# Patient Record
Sex: FEMALE | Race: WHITE | Employment: FULL TIME | URBAN - METROPOLITAN AREA
[De-identification: names, ages, dates, MRNs, and addresses within clinical notes are randomized per-mention and may not be internally consistent; named-entity substitution may affect disease eponyms.]

---

## 2020-10-27 ENCOUNTER — VIRTUAL VISIT (OUTPATIENT)
Dept: FAMILY MEDICINE CLINIC | Age: 30
End: 2020-10-27
Payer: MEDICAID

## 2020-10-27 DIAGNOSIS — F41.9 ANXIETY: ICD-10-CM

## 2020-10-27 DIAGNOSIS — F32.A DEPRESSION, UNSPECIFIED DEPRESSION TYPE: Primary | ICD-10-CM

## 2020-10-27 DIAGNOSIS — Z72.89 VAPES NON-NICOTINE CONTAINING SUBSTANCE: ICD-10-CM

## 2020-10-27 DIAGNOSIS — F10.10 ALCOHOL CONSUMPTION BINGE DRINKING: ICD-10-CM

## 2020-10-27 PROCEDURE — 99201 PR OFFICE OUTPATIENT NEW 10 MINUTES: CPT | Performed by: FAMILY MEDICINE

## 2020-10-27 RX ORDER — FLUTICASONE PROPIONATE 50 MCG
SPRAY, SUSPENSION (ML) NASAL
COMMUNITY
Start: 2020-09-25 | End: 2021-01-13

## 2020-10-27 RX ORDER — MONTELUKAST SODIUM 10 MG/1
TABLET ORAL
COMMUNITY
Start: 2020-09-25 | End: 2021-01-13

## 2020-10-27 RX ORDER — CETIRIZINE HCL 10 MG
TABLET ORAL
COMMUNITY
Start: 2020-09-25 | End: 2021-01-13

## 2020-10-27 RX ORDER — ALBUTEROL SULFATE 90 UG/1
AEROSOL, METERED RESPIRATORY (INHALATION)
COMMUNITY
Start: 2020-09-25 | End: 2021-01-13

## 2020-10-27 RX ORDER — ACETAMINOPHEN AND CODEINE PHOSPHATE 120; 12 MG/5ML; MG/5ML
1 SOLUTION ORAL DAILY
COMMUNITY
Start: 2020-08-06 | End: 2020-12-01

## 2020-10-27 RX ORDER — SERTRALINE HYDROCHLORIDE 50 MG/1
50 TABLET, FILM COATED ORAL DAILY
COMMUNITY
Start: 2020-08-06 | End: 2020-10-27

## 2020-10-27 RX ORDER — AZELASTINE 1 MG/ML
SPRAY, METERED NASAL
COMMUNITY
Start: 2020-09-25 | End: 2021-01-13

## 2020-10-27 RX ORDER — SERTRALINE HYDROCHLORIDE 100 MG/1
100 TABLET, FILM COATED ORAL DAILY
Qty: 30 TAB | Refills: 0 | Status: SHIPPED | OUTPATIENT
Start: 2020-10-27 | End: 2020-12-01

## 2020-10-27 NOTE — PROGRESS NOTES
Julieta Baez is a 26 y.o. female who was seen by synchronous (real-time) audio-video technology on 10/27/2020. Assessment & Plan:   Diagnoses and all orders for this visit:    1. Depression, unspecified depression type  -     sertraline (ZOLOFT) 100 mg tablet; Take 1 Tab by mouth daily. 2. Anxiety    3. Alcohol consumption binge drinking    4. Vapes non-nicotine containing substance        Depression seems to be doing OK, but has some anxiety  Increase Zoloft  Counseled to stop drinking and stop vapeing    Follow-up and Dispositions    · Return in about 1 month (around 11/27/2020) for depression, anxiety. Reviewed plan of care. Patient has provided input and agrees with goals. CPT Codes 49059-68247 for Established Patients may apply to this Telehealth Visit      Subjective:   Julieta Baez was seen for Establish Care and Medication Refill      Patient presents with:  Establish Care  Medication Refill    She has post-partum depression after giving birth nine months ago. Her OB is prescribing Zoloft which is working well. Also, she binge drinks, but is not ready to cut back. Review of Systems   Constitutional: Positive for malaise/fatigue. Negative for weight loss. No weight gain   Respiratory: Negative for shortness of breath. Cardiovascular: Negative for chest pain and palpitations. Psychiatric/Behavioral: Negative for depression, hallucinations, substance abuse and suicidal ideas. The patient is nervous/anxious. The patient does not have insomnia. Objective:     Physical Exam  Constitutional:       General: She is not in acute distress. Appearance: Normal appearance. Neurological:      Mental Status: She is alert and oriented to person, place, and time. Motor: No tremor. Psychiatric:         Mood and Affect: Mood normal.         Behavior: Behavior normal.         Thought Content:  Thought content normal.         Judgment: Judgment normal.         Due to this being a TeleHealth evaluation, many elements of the physical examination are unable to be assessed. We discussed the expected course, resolution and complications of the diagnosis(es) in detail. Medication risks, benefits, costs, interactions, and alternatives were discussed as indicated. I advised her to contact the office if her condition worsens, changes or fails to improve as anticipated. She expressed understanding with the diagnosis(es) and plan. Pursuant to the emergency declaration under the [address] [place] [address], [facility] waiver authority and the [employer] and [employer] Act, this Virtual  Visit was conducted, with patient's consent, to reduce the patient's risk of exposure to COVID-19 and provide continuity of care for an established patient. Services were provided through a video synchronous discussion virtually to substitute for in-person clinic visit.     Haleigh Waldrop MD

## 2020-10-27 NOTE — PROGRESS NOTES
Chief Complaint   Patient presents with   [address] [place] [address]    Medication Refill   1. Have you been to the ER, urgent care clinic since your last visit? Hospitalized since your last visit? No    2. Have you seen or consulted any other health care providers outside of the [address] [place] [address] since your last visit? Include any pap smears or colon screening.  No

## 2020-11-30 DIAGNOSIS — F32.A DEPRESSION, UNSPECIFIED DEPRESSION TYPE: ICD-10-CM

## 2020-11-30 RX ORDER — SERTRALINE HYDROCHLORIDE 100 MG/1
100 TABLET, FILM COATED ORAL DAILY
Qty: 30 TAB | Refills: 0 | Status: CANCELLED | OUTPATIENT
Start: 2020-11-30

## 2020-11-30 NOTE — TELEPHONE ENCOUNTER
Pt requesting refill on Zoloft stating she was only given 30 day supply. Pt scheduled for virtual visit with Dr. Tommie Palomo for tomorrow due to needing follow up and will address refill then.  Valente

## 2020-12-01 ENCOUNTER — VIRTUAL VISIT (OUTPATIENT)
Dept: FAMILY MEDICINE CLINIC | Age: 30
End: 2020-12-01
Payer: MEDICAID

## 2020-12-01 DIAGNOSIS — F32.A DEPRESSION, UNSPECIFIED DEPRESSION TYPE: Primary | ICD-10-CM

## 2020-12-01 DIAGNOSIS — F41.9 ANXIETY: ICD-10-CM

## 2020-12-01 PROCEDURE — 99213 OFFICE O/P EST LOW 20 MIN: CPT | Performed by: FAMILY MEDICINE

## 2020-12-01 RX ORDER — SERTRALINE HYDROCHLORIDE 100 MG/1
150 TABLET, FILM COATED ORAL DAILY
Qty: 45 TAB | Refills: 1 | Status: SHIPPED | OUTPATIENT
Start: 2020-12-01 | End: 2021-01-13

## 2020-12-01 NOTE — PROGRESS NOTES
Ashish Chin is a 26 y.o. female who was seen by synchronous (real-time) audio-video technology on 12/1/2020. Assessment & Plan:   Diagnoses and all orders for this visit:    1. Depression, unspecified depression type  -     sertraline (ZOLOFT) 100 mg tablet; Take 1.5 Tabs by mouth daily. 2. Anxiety  -     sertraline (ZOLOFT) 100 mg tablet; Take 1.5 Tabs by mouth daily. Doing better, needs a little more improvement on her anxiety and sleep  Increase Zoloft    Follow-up and Dispositions    · Return in about 6 weeks (around 1/12/2021) for depression, anxiety. Reviewed plan of care. Patient has provided input and agrees with goals. CPT Codes 76085-03806 for Established Patients may apply to this Telehealth Visit      Subjective:   Ashish Chin was seen for Depression and Medication Refill      Ashish Chin is here to follow up on their depression and anxiety. He/she is better. Currently, they are taking Zoloft which is/are working well. Review of Systems   Constitutional: Negative for malaise/fatigue and weight loss. No weight gain   Respiratory: Negative for shortness of breath. Cardiovascular: Negative for chest pain and palpitations. Psychiatric/Behavioral: Negative for depression, hallucinations, substance abuse and suicidal ideas. The patient is nervous/anxious. The patient does not have insomnia. She is sleeping too much         Objective:     Physical Exam  Constitutional:       General: She is not in acute distress. Appearance: Normal appearance. Neurological:      Mental Status: She is alert and oriented to person, place, and time. Motor: No tremor. Psychiatric:         Mood and Affect: Mood normal.         Behavior: Behavior normal.         Thought Content:  Thought content normal.         Judgment: Judgment normal.         Due to this being a TeleHealth evaluation, many elements of the physical examination are unable to be assessed. We discussed the expected course, resolution and complications of the diagnosis(es) in detail. Medication risks, benefits, costs, interactions, and alternatives were discussed as indicated. I advised her to contact the office if her condition worsens, changes or fails to improve as anticipated. She expressed understanding with the diagnosis(es) and plan. Pursuant to the emergency declaration under the [facility] [address] authority and the [employer] and [employer] Act, this Virtual  Visit was conducted, with patient's consent, to reduce the patient's risk of exposure to COVID-19 and provide continuity of care for an established patient. Services were provided through a video synchronous discussion virtually to substitute for in-person clinic visit.     Julio Zacarias MD

## 2020-12-01 NOTE — PROGRESS NOTES
Chief Complaint   Patient presents with    Depression    Medication Refill   1. Have you been to the ER, urgent care clinic since your last visit? Hospitalized since your last visit? No  2. Have you seen or consulted any other health care providers outside of the [address] [place] [address] since your last visit? Include any pap smears or colon screening.  No

## 2021-01-08 ENCOUNTER — TELEPHONE (OUTPATIENT)
Dept: FAMILY MEDICINE CLINIC | Age: 31
End: 2021-01-08

## 2021-01-08 NOTE — TELEPHONE ENCOUNTER
Pt requesting call back from Dr. Lilian Calhoun or her nurse regarding Zoloft stating she's been feeling spaced out and forgetting things at work but is not sure if this is related to the medicine or due to the fact she quit drinking, doing other drugs, and started smoking marijuana. Pt requests call back at [phone] to advise and did schedule in office follow up for 1/13/21 with Dr. Lilian Calhoun.   Valente

## 2021-01-13 ENCOUNTER — OFFICE VISIT (OUTPATIENT)
Dept: FAMILY MEDICINE CLINIC | Age: 31
End: 2021-01-13
Payer: MEDICAID

## 2021-01-13 VITALS
SYSTOLIC BLOOD PRESSURE: 121 MMHG | DIASTOLIC BLOOD PRESSURE: 82 MMHG | BODY MASS INDEX: 33.29 KG/M2 | HEART RATE: 63 BPM | WEIGHT: 195 LBS | HEIGHT: 64 IN | RESPIRATION RATE: 20 BRPM | TEMPERATURE: 97 F

## 2021-01-13 DIAGNOSIS — E66.9 CLASS 1 OBESITY WITHOUT SERIOUS COMORBIDITY WITH BODY MASS INDEX (BMI) OF 33.0 TO 33.9 IN ADULT, UNSPECIFIED OBESITY TYPE: ICD-10-CM

## 2021-01-13 DIAGNOSIS — F32.A DEPRESSION, UNSPECIFIED DEPRESSION TYPE: Primary | ICD-10-CM

## 2021-01-13 DIAGNOSIS — F41.9 ANXIETY: ICD-10-CM

## 2021-01-13 DIAGNOSIS — F10.10 ALCOHOL CONSUMPTION BINGE DRINKING: ICD-10-CM

## 2021-01-13 DIAGNOSIS — R63.5 WEIGHT GAIN: ICD-10-CM

## 2021-01-13 PROCEDURE — 99214 OFFICE O/P EST MOD 30 MIN: CPT | Performed by: FAMILY MEDICINE

## 2021-01-13 RX ORDER — SERTRALINE HYDROCHLORIDE 100 MG/1
200 TABLET, FILM COATED ORAL DAILY
Qty: 60 TAB | Refills: 1 | Status: SHIPPED | OUTPATIENT
Start: 2021-01-13 | End: 2021-04-04

## 2021-01-13 NOTE — PROGRESS NOTES
HISTORY OF PRESENT ILLNESS  Julio Birmingham is a 26 y.o. female. Julio Birmingham is here to follow up on their depression and anxiety. He/she is better. Currently, they are taking Zoloft which is/are working well. She has a history of alcohol abuse and quit drinking, however, now she is eating more and gaining weight. Review of Systems   Constitutional: Positive for malaise/fatigue. Negative for weight loss. Weight gain   Respiratory: Positive for shortness of breath. Cardiovascular: Negative for chest pain and palpitations. Psychiatric/Behavioral: Positive for hallucinations. Negative for depression, substance abuse and suicidal ideas. The patient is not nervous/anxious and does not have insomnia. THC       Visit Vitals  /82   Pulse 63   Temp 97 °F (36.1 °C) (Temporal)   Resp 20   Ht 5' 4\" (1.626 m)   Wt 195 lb (88.5 kg)   BMI 33.47 kg/m²     Physical Exam  Constitutional:       General: She is not in acute distress. Appearance: Normal appearance. Neurological:      Mental Status: She is alert and oriented to person, place, and time. Motor: No tremor. Psychiatric:         Mood and Affect: Mood normal.         Behavior: Behavior normal.         Thought Content: Thought content normal.         Judgment: Judgment normal.         ASSESSMENT and PLAN    ICD-10-CM ICD-9-CM    1. Depression, unspecified depression type  F32.9 311 sertraline (ZOLOFT) 100 mg tablet      REFERRAL TO PSYCHOLOGY   2. Anxiety  F41.9 300.00 sertraline (ZOLOFT) 100 mg tablet      REFERRAL TO PSYCHOLOGY   3. Alcohol consumption binge drinking  F10.10 305.00 REFERRAL TO PSYCHOLOGY   4. Class 1 obesity without serious comorbidity with body mass index (BMI) of 33.0 to 33.9 in adult, unspecified obesity type  E66.9 278.00     Z68.33 V85.33    5.  Weight gain  R63.5 783.1         Depression improving, fatigue may represent some residual symptoms  Anxiety doing well  Not drinking  Weight gain  Increase Zoloft  Referred for counseling  Regular exercise, watch diet    Follow-up and Dispositions    · Return in about 6 weeks (around 2/24/2021) for depression, anxiety. Reviewed plan of care. Patient has provided input and agrees with goals.

## 2021-03-02 ENCOUNTER — VIRTUAL VISIT (OUTPATIENT)
Dept: FAMILY MEDICINE CLINIC | Age: 31
End: 2021-03-02
Payer: MEDICAID

## 2021-03-02 DIAGNOSIS — F32.A DEPRESSION, UNSPECIFIED DEPRESSION TYPE: Primary | ICD-10-CM

## 2021-03-02 DIAGNOSIS — L50.9 URTICARIA: ICD-10-CM

## 2021-03-02 DIAGNOSIS — F10.10 ALCOHOL CONSUMPTION BINGE DRINKING: ICD-10-CM

## 2021-03-02 DIAGNOSIS — F41.9 ANXIETY: ICD-10-CM

## 2021-03-02 PROCEDURE — 99214 OFFICE O/P EST MOD 30 MIN: CPT | Performed by: FAMILY MEDICINE

## 2021-03-02 RX ORDER — CIMETIDINE 400 MG/1
400 TABLET, FILM COATED ORAL 2 TIMES DAILY
Qty: 60 TAB | Refills: 0 | Status: SHIPPED | OUTPATIENT
Start: 2021-03-02 | End: 2021-03-16

## 2021-03-02 RX ORDER — CETIRIZINE HYDROCHLORIDE 10 MG/1
10 CAPSULE, LIQUID FILLED ORAL DAILY
Qty: 30 CAP | Refills: 0 | Status: SHIPPED | OUTPATIENT
Start: 2021-03-02 | End: 2021-03-16

## 2021-03-02 NOTE — PROGRESS NOTES
Rosmery Sharma is a 26 y.o. female who was seen by synchronous (real-time) audio-video technology on 3/2/2021. Assessment & Plan:   Diagnoses and all orders for this visit:    1. Depression, unspecified depression type    2. Anxiety    3. Alcohol consumption binge drinking    4. Urticaria  -     Cetirizine (ZyrTEC) 10 mg cap; Take 10 mg by mouth daily. -     cimetidine (TAGAMET) 400 mg tablet; Take 1 Tab by mouth two (2) times a day. Depression and anxiety doing well  Abstaining from alcohol  Likely urticaria  Continue current plans. Daily Zyrtec and BID Tagamet for one month  She will call right away if she has further skin symptoms  To ER right away if lip or facial swelling    Follow-up and Dispositions    · Return in about 3 months (around 6/2/2021) for depression, anxiety. Reviewed plan of care. Patient has provided input and agrees with goals. CPT Codes 23170-99600 for Established Patients may apply to this Telehealth Visit      Subjective:   Rosmery Sharma was seen for Depression (follow up ) and Skin Problem (itching skin/rash )      Patient presents with:  Depression: follow up   Skin Problem: itching skin/rash     Rosmery Sharma is here to follow up on their depression and anxiety. He/she is better. Currently, they are taking Zoloft which is/are working well    She also has a history of binge drinking and has had no alcohol for almost 3 months. The itching started almost a week ago. It has been daily and lasts for about 15 minutes. She had 2 hives once, otherwise it is red. Currently, she is taking Benadryl Allergy but she is not sure if this helps. The itching is mainly on her back and arms. Review of Systems   Constitutional: Negative for malaise/fatigue and weight loss. No weight gain   Respiratory: Negative for shortness of breath. Cardiovascular: Negative for chest pain and palpitations.    Psychiatric/Behavioral: Positive for substance abuse. Negative for depression, hallucinations and suicidal ideas. The patient is not nervous/anxious and does not have insomnia. Occasional THC         Objective:     Physical Exam  Constitutional:       General: She is not in acute distress. Appearance: Normal appearance. Neurological:      Mental Status: She is alert and oriented to person, place, and time. Motor: No tremor. Psychiatric:         Mood and Affect: Mood normal.         Behavior: Behavior normal.         Thought Content: Thought content normal.         Judgment: Judgment normal.         Due to this being a TeleHealth evaluation, many elements of the physical examination are unable to be assessed. We discussed the expected course, resolution and complications of the diagnosis(es) in detail. Medication risks, benefits, costs, interactions, and alternatives were discussed as indicated. I advised her to contact the office if her condition worsens, changes or fails to improve as anticipated. She expressed understanding with the diagnosis(es) and plan. Pursuant to the emergency declaration under the [facility], [facility] waiver authority and the [employer] and [employer] General Act, this Virtual  Visit was conducted, with patient's consent, to reduce the patient's risk of exposure to COVID-19 and provide continuity of care for an established patient. Services were provided through a video synchronous discussion virtually to substitute for in-person clinic visit.     Rudolph Dickinson MD

## 2021-03-02 NOTE — PROGRESS NOTES
Chief Complaint   Patient presents with    Depression     follow up     Skin Problem     itching skin/rash

## 2021-03-15 ENCOUNTER — TELEPHONE (OUTPATIENT)
Dept: FAMILY MEDICINE CLINIC | Age: 31
End: 2021-03-15

## 2021-03-16 ENCOUNTER — VIRTUAL VISIT (OUTPATIENT)
Dept: FAMILY MEDICINE CLINIC | Age: 31
End: 2021-03-16
Payer: MEDICAID

## 2021-03-16 DIAGNOSIS — F41.9 ANXIETY: ICD-10-CM

## 2021-03-16 DIAGNOSIS — F10.10 ALCOHOL CONSUMPTION BINGE DRINKING: ICD-10-CM

## 2021-03-16 DIAGNOSIS — F32.A DEPRESSION, UNSPECIFIED DEPRESSION TYPE: Primary | ICD-10-CM

## 2021-03-16 PROCEDURE — 99214 OFFICE O/P EST MOD 30 MIN: CPT | Performed by: FAMILY MEDICINE

## 2021-03-16 RX ORDER — BUSPIRONE HYDROCHLORIDE 10 MG/1
10 TABLET ORAL 3 TIMES DAILY
Qty: 90 TAB | Refills: 0 | Status: SHIPPED | OUTPATIENT
Start: 2021-03-16 | End: 2021-04-18

## 2021-03-16 RX ORDER — ETONOGESTREL AND ETHINYL ESTRADIOL 11.7; 2.7 MG/1; MG/1
INSERT, EXTENDED RELEASE VAGINAL
COMMUNITY

## 2021-03-16 NOTE — PROGRESS NOTES
Chief Complaint   Patient presents with    Depression     Follow up    Medication Evaluation     1. Have you been to the ER, urgent care clinic since your last visit? Hospitalized since your last visit? No    2. Have you seen or consulted any other health care providers outside of the [address] [place] [address] since your last visit? Include any pap smears or colon screening.  No

## 2021-03-16 NOTE — PROGRESS NOTES
Oscar Rocha is a 26 y.o. female who was seen by synchronous (real-time) audio-video technology on 3/16/2021. Assessment & Plan:   Diagnoses and all orders for this visit:    1. Depression, unspecified depression type  -     REFERRAL TO PSYCHOLOGY    2. Anxiety  -     REFERRAL TO PSYCHOLOGY  -     busPIRone (BUSPAR) 10 mg tablet; Take 1 Tab by mouth three (3) times daily. 3. Alcohol consumption binge drinking        Depression and anxiety worse due to stress  Fortunately not drinking again but at risk for this  Add Buspar  She is going to call this afternoon to set up a psychology appointment  Commended on abstaining from alcohol    Follow-up and Dispositions    · Return in about 1 week (around 3/23/2021) for depression, anxiety, alcohol use. Reviewed plan of care. Patient has provided input and agrees with goals. CPT Codes 61259-27394 for Established Patients may apply to this Telehealth Visit      Subjective:   Oscar Rocha was seen for Depression (Follow up) and Medication Evaluation      Patient presents with:  Depression: Follow up  Medication Evaluation    She has had a bad couple of weeks. Lately, she has been really sad, was not sleeping until last night and her appetite has been decreased. She just starting splitting custody, broke up with another kali and her car broke down. She cried so hysterically at work she had to go home. No alcohol. She never scheduled for counseling. Her anxiety is worse than her depression. Review of Systems   Constitutional: Positive for weight loss. Negative for malaise/fatigue. No weight gain   Respiratory: Negative for shortness of breath. Cardiovascular: Negative for chest pain and palpitations. Psychiatric/Behavioral: Positive for depression and substance abuse. Negative for hallucinations and suicidal ideas. The patient is nervous/anxious and has insomnia.          THC         Objective:     Physical Exam  Constitutional:       General: She is not in acute distress. Appearance: Normal appearance. Neurological:      Mental Status: She is alert and oriented to person, place, and time. Motor: No tremor. Psychiatric:         Mood and Affect: Mood normal.         Behavior: Behavior normal.         Thought Content: Thought content normal.         Judgment: Judgment normal.         Due to this being a TeleHealth evaluation, many elements of the physical examination are unable to be assessed. We discussed the expected course, resolution and complications of the diagnosis(es) in detail. Medication risks, benefits, costs, interactions, and alternatives were discussed as indicated. I advised her to contact the office if her condition worsens, changes or fails to improve as anticipated. She expressed understanding with the diagnosis(es) and plan. Pursuant to the emergency declaration under the [facility], [facility] waiver authority and the [employer] and [employer] General Act, this Virtual  Visit was conducted, with patient's consent, to reduce the patient's risk of exposure to COVID-19 and provide continuity of care for an established patient. Services were provided through a video synchronous discussion virtually to substitute for in-person clinic visit.     Ed Gonzalez MD

## 2021-04-01 DIAGNOSIS — F32.A DEPRESSION, UNSPECIFIED DEPRESSION TYPE: ICD-10-CM

## 2021-04-01 DIAGNOSIS — F41.9 ANXIETY: ICD-10-CM

## 2021-04-04 RX ORDER — SERTRALINE HYDROCHLORIDE 100 MG/1
TABLET, FILM COATED ORAL
Qty: 60 TAB | Refills: 1 | Status: SHIPPED | OUTPATIENT
Start: 2021-04-04 | End: 2021-05-14 | Stop reason: SDUPTHER

## 2021-04-05 ENCOUNTER — TELEPHONE (OUTPATIENT)
Dept: FAMILY MEDICINE CLINIC | Age: 31
End: 2021-04-05

## 2021-04-05 NOTE — TELEPHONE ENCOUNTER
Called pt, and left a voice message, RX was sent to her pharmacy on yesterday. Advised to call office back with questions or concerns.

## 2021-04-05 NOTE — TELEPHONE ENCOUNTER
----- Message from Stu Nunez sent at 4/3/2021 11:32 AM EDT -----  Regarding: Dr. Fowler Cypriot: [phone]  General Message/Vendor Calls    Caller's first and last name: Pt.       Reason for call: Requested Rx refill on Thursday, pharmacy does not have Rx. Pt is out of medication. Callback required yes/no and why: Yes, status of Rx refill request.       Best contact number(s): [phone]      Details to clarify the request: n/a.       Stu Nunez

## 2021-04-18 DIAGNOSIS — F41.9 ANXIETY: ICD-10-CM

## 2021-04-18 RX ORDER — BUSPIRONE HYDROCHLORIDE 10 MG/1
TABLET ORAL
Qty: 270 TAB | Refills: 3 | Status: SHIPPED | OUTPATIENT
Start: 2021-04-18 | End: 2022-03-17

## 2021-04-30 ENCOUNTER — VIRTUAL VISIT (OUTPATIENT)
Dept: FAMILY MEDICINE CLINIC | Age: 31
End: 2021-04-30

## 2021-04-30 DIAGNOSIS — Z71.84 TRAVEL ADVICE ENCOUNTER: Primary | ICD-10-CM

## 2021-04-30 PROCEDURE — 99214 OFFICE O/P EST MOD 30 MIN: CPT | Performed by: FAMILY MEDICINE

## 2021-04-30 NOTE — PROGRESS NOTES
Jeanne Villalta is a 26 y.o. female who was seen by synchronous (real-time) audio-video technology on 4/30/2021. Assessment & Plan:   Diagnoses and all orders for this visit:    1. Travel advice encounter        Researched travel immunizations and medications  It appears that she needs hep A, yellow fever and TDAP immunizations plus malaria prophylaxis and she was advised of this  She is unsure of her childhood immunizations  Advised that she get her childhood immunizations and make sure they are up to date  Recommended the [facility] travel web site  Contact the state health department to find a yellow fever vaccine destination  Questions answered    Over 30 minutes was spent in patient care    Follow-up and Dispositions    · Return if symptoms worsen or fail to improve. Reviewed plan of care. Patient has provided input and agrees with goals. CPT Codes 98604-38602 for Established Patients may apply to this Telehealth Visit      Subjective:   Jeanne Villalta was seen for Travel Consult      She is going to [place] in about 2 months and she needs a vaccine. 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 She will be going to [facility] [place] and [facility]. It has been over 10 years since her last tetanus. She is planning to stay in the city in hotels. Review of Systems   Constitutional: Negative for malaise/fatigue. Objective:     Physical Exam  Constitutional:       General: She is not in acute distress. Appearance: Normal appearance. Neurological:      Mental Status: She is alert and oriented to person, place, and time. Due to this being a TeleHealth evaluation, many elements of the physical examination are unable to be assessed. We discussed the expected course, resolution and complications of the diagnosis(es) in detail. Medication risks, benefits, costs, interactions, and alternatives were discussed as indicated.   I advised her to contact the office if her condition worsens, changes or fails to improve as anticipated. She expressed understanding with the diagnosis(es) and plan. Pursuant to the emergency declaration under the [facility], [facility], [facility] waiver authority and the [employer] and [employer] Act, this Virtual  Visit was conducted, with patient's consent, to reduce the patient's risk of exposure to COVID-19 and provide continuity of care for an established patient. Services were provided through a video synchronous discussion virtually to substitute for in-person clinic visit.     Erasto Murphy MD

## 2021-05-14 DIAGNOSIS — F41.9 ANXIETY: ICD-10-CM

## 2021-05-14 DIAGNOSIS — F32.A DEPRESSION, UNSPECIFIED DEPRESSION TYPE: ICD-10-CM

## 2021-05-14 RX ORDER — SERTRALINE HYDROCHLORIDE 100 MG/1
TABLET, FILM COATED ORAL
Qty: 180 TAB | Refills: 3 | Status: SHIPPED | OUTPATIENT
Start: 2021-05-14 | End: 2022-03-17

## 2021-05-14 NOTE — TELEPHONE ENCOUNTER
Pt called stating she lost her Zoloft medication and wants to know if Dr. Galvin Romberg can send another prescription to [address] [place] [address]. Pt will contact her insurance company to request vacation override since this may trigger refill too soon if ordered again. Pt also wants to advise Dr. Galvin Romberg she was able to get the Yellow Fever vaccine.  Valente

## 2022-03-17 ENCOUNTER — VIRTUAL VISIT (OUTPATIENT)
Dept: FAMILY MEDICINE CLINIC | Age: 32
End: 2022-03-17
Payer: MEDICAID

## 2022-03-17 DIAGNOSIS — F32.A DEPRESSION, UNSPECIFIED DEPRESSION TYPE: ICD-10-CM

## 2022-03-17 DIAGNOSIS — F41.9 ANXIETY: ICD-10-CM

## 2022-03-17 PROCEDURE — 99214 OFFICE O/P EST MOD 30 MIN: CPT | Performed by: FAMILY MEDICINE

## 2022-03-17 RX ORDER — BUSPIRONE HYDROCHLORIDE 10 MG/1
10 TABLET ORAL 3 TIMES DAILY
Qty: 90 TABLET | Refills: 0 | Status: SHIPPED | OUTPATIENT
Start: 2022-03-17 | End: 2022-04-19 | Stop reason: SDUPTHER

## 2022-03-17 RX ORDER — SERTRALINE HYDROCHLORIDE 50 MG/1
50 TABLET, FILM COATED ORAL DAILY
Qty: 30 TABLET | Refills: 0 | Status: SHIPPED | OUTPATIENT
Start: 2022-03-17 | End: 2022-04-19 | Stop reason: ALTCHOICE

## 2022-03-17 NOTE — PROGRESS NOTES
Kelsey Thomas is a 33 y.o. female who was seen by synchronous (real-time) audio-video technology on 3/17/2022. Assessment & Plan:   Diagnoses and all orders for this visit:    1. Depression, unspecified depression type  -     sertraline (ZOLOFT) 50 mg tablet; Take 1 Tablet by mouth daily.  -     REFERRAL TO PSYCHOLOGY    2. Anxiety  -     sertraline (ZOLOFT) 50 mg tablet; Take 1 Tablet by mouth daily. -     busPIRone (BUSPAR) 10 mg tablet; Take 1 Tablet by mouth three (3) times daily.  -     REFERRAL TO PSYCHOLOGY        Not doing well off medications  Restart Zoloft at a lower dose  Restart Buspar  Psychology referral    Follow-up and Dispositions    · Return in about 3 weeks (around 4/7/2022) for depression, anxiety. Reviewed plan of care. Patient has provided input and agrees with goals. CPT Codes 35632-85555 for Established Patients may apply to this Telehealth Visit      Subjective:   Kelsey Thomas was seen for Medication Evaluation (Zoloft and Buspar) and Anxiety (Would like to discuss restarting medication.)      Patient presents with:  Medication Evaluation: Zoloft and Buspar  Anxiety: Would like to discuss restarting medication. She is having trouble with her depression and anxiety. The last time she took her medications was in May. Review of Systems   Constitutional: Negative for malaise/fatigue and weight loss. Weight gain   Respiratory: Negative for shortness of breath. Cardiovascular: Negative for chest pain and palpitations. Psychiatric/Behavioral: Positive for depression. Negative for hallucinations, substance abuse and suicidal ideas. The patient is nervous/anxious and has insomnia. Objective:     Physical Exam  Constitutional:       General: She is not in acute distress. Appearance: Normal appearance. Neurological:      Mental Status: She is alert and oriented to person, place, and time. Motor: No tremor.    Psychiatric: Mood and Affect: Mood normal.         Behavior: Behavior normal.         Thought Content: Thought content normal.         Judgment: Judgment normal.         Due to this being a TeleHealth evaluation, many elements of the physical examination are unable to be assessed. We discussed the expected course, resolution and complications of the diagnosis(es) in detail. Medication risks, benefits, costs, interactions, and alternatives were discussed as indicated. I advised her to contact the office if her condition worsens, changes or fails to improve as anticipated. She expressed understanding with the diagnosis(es) and plan. Pursuant to the emergency declaration under the [facility] waiver authority and the [employer] and [employer] Act, this Virtual  Visit was conducted, with patient's consent, to reduce the patient's risk of exposure to COVID-19 and provide continuity of care for an established patient. Services were provided through a video synchronous discussion virtually to substitute for in-person clinic visit.     Jose Carlos Avery MD

## 2022-03-17 NOTE — PROGRESS NOTES
Chief Complaint   Patient presents with    Medication Evaluation     Zoloft and Buspar    Anxiety     Would like to discuss restarting medication. 1. Have you been to the ER, urgent care clinic since your last visit? Hospitalized since your last visit? Yes, [facility], Ear infection and GI bug.    2. Have you seen or consulted any other health care providers outside of the [address] [place] [address] since your last visit? Include any pap smears or colon screening.  No

## 2022-03-20 PROBLEM — F41.9 ANXIETY: Status: ACTIVE | Noted: 2020-10-27

## 2022-04-19 ENCOUNTER — VIRTUAL VISIT (OUTPATIENT)
Dept: FAMILY MEDICINE CLINIC | Age: 32
End: 2022-04-19
Payer: MEDICAID

## 2022-04-19 DIAGNOSIS — Z11.59 NEED FOR HEPATITIS C SCREENING TEST: ICD-10-CM

## 2022-04-19 DIAGNOSIS — F32.A DEPRESSION, UNSPECIFIED DEPRESSION TYPE: Primary | ICD-10-CM

## 2022-04-19 DIAGNOSIS — R63.5 WEIGHT GAIN: ICD-10-CM

## 2022-04-19 DIAGNOSIS — F41.9 ANXIETY: ICD-10-CM

## 2022-04-19 PROCEDURE — 99214 OFFICE O/P EST MOD 30 MIN: CPT | Performed by: FAMILY MEDICINE

## 2022-04-19 RX ORDER — BUPROPION HYDROCHLORIDE 150 MG/1
150 TABLET ORAL DAILY
Qty: 30 TABLET | Refills: 0 | Status: SHIPPED | OUTPATIENT
Start: 2022-04-19 | End: 2022-05-20 | Stop reason: SDUPTHER

## 2022-04-19 RX ORDER — SERTRALINE HYDROCHLORIDE 50 MG/1
50 TABLET, FILM COATED ORAL DAILY
Qty: 30 TABLET | Refills: 0 | Status: CANCELLED | OUTPATIENT
Start: 2022-04-19

## 2022-04-19 RX ORDER — BUSPIRONE HYDROCHLORIDE 10 MG/1
10 TABLET ORAL 3 TIMES DAILY
Qty: 90 TABLET | Refills: 0 | Status: SHIPPED | OUTPATIENT
Start: 2022-04-19 | End: 2022-05-20 | Stop reason: SDUPTHER

## 2022-04-19 NOTE — LETTER
4/19/2022 8:38 AM    Ms. Yared [facility] [address] [facility]  [address] [facility]  Luigi [address]  Phone: [phone]  Fax: [phone]    4/19/2022     Andrew Craig   1990   [address] [place] [address]      To Whom It May Concern,    The above-named patient is receiving medical care at [facility]. Please fax a copy of the following document(s) for continuity of care. Fax number :  [phone] - pa results     We look forward to partnering with you to provide collaborative patient care. Please reach out to our office at [phone] if there are questions regarding this request.    Sincerely,    Pat Marcelino and Anabella [address]

## 2022-04-19 NOTE — PROGRESS NOTES
Janice Sampson is a 33 y.o. female who was seen by synchronous (real-time) audio-video technology on 4/19/2022. Assessment & Plan:   Diagnoses and all orders for this visit:    1. Depression, unspecified depression type  -     buPROPion XL (Wellbutrin XL) 150 mg tablet; Take 1 Tablet by mouth daily. 2. Anxiety  -     busPIRone (BUSPAR) 10 mg tablet; Take 1 Tablet by mouth three (3) times daily. 3. Weight gain    4. Need for hepatitis C screening test  -     HEPATITIS C AB; Future        Depression doing well, anxiety doing fairly well  Weight gain likely due to Zoloft  Stop Zoloft  start Wellbutrin - cautioned that his may make her anxiety worse    Follow-up and Dispositions    · Return in about 3 weeks (around 5/10/2022) for depression. Reviewed plan of care. Patient has provided input and agrees with goals. CPT Codes 56544-66889 for Established Patients may apply to this Telehealth Visit      Subjective:   Janice Sampson was seen for Depression (follow up - pt states RX's are working well, but feels she has had weight gain ) and Anxiety (follow up )      Patient presents with:  Depression: follow up - pt states RX's are working well, but feels she has had weight gain   Anxiety: follow up     Her anxiety has improved and medications are working well. However, she has gained 5-10 pounds over the past month. She has been more hungry and is craving carbs. Also, she has a history of excessive alcohol use. She is drinking about 2 drinks a week. Review of Systems   Constitutional: Negative for malaise/fatigue and weight loss. Weight gain   Respiratory: Negative for shortness of breath. Cardiovascular: Negative for chest pain and palpitations. Psychiatric/Behavioral: Negative for depression, hallucinations, substance abuse and suicidal ideas. The patient is nervous/anxious. The patient does not have insomnia.           Objective:     Physical Exam  Constitutional:       General: She is not in acute distress. Appearance: Normal appearance. Neurological:      Mental Status: She is alert and oriented to person, place, and time. Motor: No tremor. Psychiatric:         Mood and Affect: Mood normal.         Behavior: Behavior normal.         Thought Content: Thought content normal.         Judgment: Judgment normal.         Due to this being a TeleHealth evaluation, many elements of the physical examination are unable to be assessed. We discussed the expected course, resolution and complications of the diagnosis(es) in detail. Medication risks, benefits, costs, interactions, and alternatives were discussed as indicated. I advised her to contact the office if her condition worsens, changes or fails to improve as anticipated. She expressed understanding with the diagnosis(es) and plan. Pursuant to the emergency declaration under the [facility], [facility] waiver authority and the [employer] and [employer] General Act, this Virtual  Visit was conducted, with patient's consent, to reduce the patient's risk of exposure to COVID-19 and provide continuity of care for an established patient. Services were provided through a video synchronous discussion virtually to substitute for in-person clinic visit.     Tabatha Marino MD

## 2022-04-19 NOTE — PROGRESS NOTES
Chief Complaint   Patient presents with    Depression     follow up - pt states RX's are working well, but feels she has had weight gain     Anxiety     follow up      Dr. Liset Garrett, GYN- records requested

## 2022-05-20 ENCOUNTER — VIRTUAL VISIT (OUTPATIENT)
Dept: FAMILY MEDICINE CLINIC | Age: 32
End: 2022-05-20
Payer: MEDICAID

## 2022-05-20 DIAGNOSIS — F41.9 ANXIETY: ICD-10-CM

## 2022-05-20 DIAGNOSIS — F32.A DEPRESSION, UNSPECIFIED DEPRESSION TYPE: ICD-10-CM

## 2022-05-20 PROCEDURE — 99214 OFFICE O/P EST MOD 30 MIN: CPT | Performed by: FAMILY MEDICINE

## 2022-05-20 RX ORDER — BUSPIRONE HYDROCHLORIDE 10 MG/1
10 TABLET ORAL
Qty: 90 TABLET | Refills: 0
Start: 2022-05-20

## 2022-05-20 RX ORDER — BUPROPION HYDROCHLORIDE 150 MG/1
150 TABLET ORAL DAILY
Qty: 90 TABLET | Refills: 0 | Status: SHIPPED | OUTPATIENT
Start: 2022-05-20

## 2022-05-20 NOTE — PROGRESS NOTES
Compa Yancey is a 33 y.o. female who was seen by synchronous (real-time) audio-video technology on 5/20/2022. Assessment & Plan:   Diagnoses and all orders for this visit:    1. Depression, unspecified depression type  -     buPROPion XL (Wellbutrin XL) 150 mg tablet; Take 1 Tablet by mouth daily. 2. Anxiety  -     busPIRone (BUSPAR) 10 mg tablet; Take 1 Tablet by mouth three (3) times daily as needed (anxiety). Depression doing well but anxiety a little worse, did not tolerate Zoloft due to weight gain  Continue current plans. Refills per orders  Continue Buspar psn  She is going to get counseling through her EAP    Follow-up and Dispositions    · Return in about 3 weeks (around 6/10/2022) for depression, anxiety. Reviewed plan of care. Patient has provided input and agrees with goals. CPT Codes 47083-33008 for Established Patients may apply to this Telehealth Visit      Subjective:   Compa Yancey was seen for Medication Refill      Compa Yancey is here to follow up on their anxiety and depression. At her last visit, her Zoloft was discontinued and Wellbutrin was started because she was gaining weight. He/she is unchanged, but the anxiety is a little worse. It is manageable. The Buspar was causing dizziness, sleepiness and headaches, so she is taking a lot less and is better. Review of Systems   Constitutional: Negative for malaise/fatigue and weight loss. No weight gain   Respiratory: Negative for shortness of breath. Cardiovascular: Negative for chest pain and palpitations. Psychiatric/Behavioral: Negative for depression, hallucinations, substance abuse and suicidal ideas. The patient is nervous/anxious. The patient does not have insomnia. Objective:     Physical Exam  Constitutional:       General: She is not in acute distress. Appearance: Normal appearance.    Neurological:      Mental Status: She is alert and oriented to person, place, and time. Motor: No tremor. Psychiatric:         Mood and Affect: Mood normal.         Behavior: Behavior normal.         Thought Content: Thought content normal.         Judgment: Judgment normal.         Due to this being a TeleHealth evaluation, many elements of the physical examination are unable to be assessed. We discussed the expected course, resolution and complications of the diagnosis(es) in detail. Medication risks, benefits, costs, interactions, and alternatives were discussed as indicated. I advised her to contact the office if her condition worsens, changes or fails to improve as anticipated. She expressed understanding with the diagnosis(es) and plan. Pursuant to the emergency declaration under the [facility], [facility] waiver authority and the [employer] and [employer] General Act, this Virtual  Visit was conducted, with patient's consent, to reduce the patient's risk of exposure to COVID-19 and provide continuity of care for an established patient. Services were provided through a video synchronous discussion virtually to substitute for in-person clinic visit.     Hank Jacob MD

## 2022-05-20 NOTE — PROGRESS NOTES
Chief Complaint   Patient presents with    Medication Refill     1. Have you been to the ER, urgent care clinic since your last visit? Hospitalized since your last visit? No    2. Have you seen or consulted any other health care providers outside of the [address] [place] [address] since your last visit? Include any pap smears or colon screening.  No

## 2023-02-24 ENCOUNTER — OFFICE VISIT (OUTPATIENT)
Dept: FAMILY MEDICINE CLINIC | Age: 33
End: 2023-02-24
Payer: MEDICAID

## 2023-02-24 VITALS
HEIGHT: 64 IN | OXYGEN SATURATION: 97 % | BODY MASS INDEX: 35.85 KG/M2 | RESPIRATION RATE: 20 BRPM | HEART RATE: 63 BPM | WEIGHT: 210 LBS | SYSTOLIC BLOOD PRESSURE: 134 MMHG | DIASTOLIC BLOOD PRESSURE: 88 MMHG | TEMPERATURE: 98.4 F

## 2023-02-24 DIAGNOSIS — O26.86 PRURITIC URTICARIAL PAPULES AND PLAQUES OF PREGNANCY: Primary | ICD-10-CM

## 2023-02-24 PROBLEM — L29.8 PRURITIC ERYTHEMATOUS RASH: Status: ACTIVE | Noted: 2023-02-24

## 2023-02-24 RX ORDER — CETIRIZINE HYDROCHLORIDE 10 MG/1
10 TABLET ORAL
Qty: 30 TABLET | Refills: 0 | Status: SHIPPED | OUTPATIENT
Start: 2023-02-24 | End: 2023-03-26

## 2023-02-24 RX ORDER — TRIAMCINOLONE ACETONIDE 1 MG/G
OINTMENT TOPICAL
COMMUNITY
Start: 2023-02-20

## 2023-02-24 RX ORDER — PREDNISONE 20 MG/1
40 TABLET ORAL DAILY
Qty: 10 TABLET | Refills: 0 | Status: SHIPPED | OUTPATIENT
Start: 2023-02-24 | End: 2023-03-01

## 2023-02-24 NOTE — ASSESSMENT & PLAN NOTE
Visually consistent with hives, and onset pattern seems very much likely PUPP, but rash appeared postpartum which is less common, though it is documented. No signs of other systemic involvement, and lack of topical improvement likely due to need for oral therapy. Discussed oral prednisone and antihistamines, and risk/benefits in light of breastfeeding.     - Prednisone burst 40mg daily x 5 days  - Zyrtec 10mg daily  - see again next week to ensure improvement, if signs of worsening despite therapy or new systemic symptoms, present to urgent care/ED.

## 2023-02-24 NOTE — PROGRESS NOTES
Chief Complaint   Patient presents with    Rash     Had a baby 10 days ago   Abdomen, arms, legs red  itchy spots   Has been taking Triamcinolone- not helping   Has had for over 1 week - but worsening       Has started a new body wash.

## 2023-02-24 NOTE — PROGRESS NOTES
Geovany Santamaria (: 1990) is a 27 y.o. female, established patient, here for evaluation of the following chief complaint(s):  Rash (Had a baby 10 days ago /Abdomen, arms, legs red  itchy spots /Has been taking Triamcinolone- not helping /Has had for over 1 week - but worsening  )       ASSESSMENT/PLAN:  Below is the assessment and plan developed based on review of pertinent history, physical exam, labs, studies, and medications. 1. Pruritic urticarial papules and plaques of pregnancy  Assessment & Plan:  Visually consistent with hives, and onset pattern seems very much likely PUPP, but rash appeared postpartum which is less common, though it is documented. No signs of other systemic involvement, and lack of topical improvement likely due to need for oral therapy. Discussed oral prednisone and antihistamines, and risk/benefits in light of breastfeeding.     - Prednisone burst 40mg daily x 5 days  - Zyrtec 10mg daily  - see again next week to ensure improvement, if signs of worsening despite therapy or new systemic symptoms, present to urgent care/ED. 2. Hemorrhoids, postpartum  Preparation H cream OTC for next week. Follow up at next visit. Return in about 1 week (around 3/3/2023) for hives/rash management. SUBJECTIVE/OBJECTIVE:  HPI  Chronic Conditions Addressed Today       1. Pruritic erythematous rash - Primary     Overview      Some itching of anterior abdomen week prior to delivery. Delivered healthy baby 1 week ago, and since then has started developing itchy raised erythematous rash with wheeling appearance. Started on anterior abdomen around stretch marks, then spread to anterior thighs, bilateral flanks, and extremities, sparing palmoplantar regions and face/groin. Patient has been applying topical triamcinolone 0.1% cream without improvement. Denies vision changes, chest pain, urinary changes, abdominal pain, yellowing of skin.                               2. Hemorrhoids, postpartum Relevant Medications     [id number].Pet (PREPARATION H MAXIMUM STRENGTH) 0.25-1 % rectal cream         Review of Systems  As per HPI    Physical Exam  Visit Vitals  /88   Pulse 63   Temp 98.4 °F (36.9 °C) (Temporal)   Resp 20   Ht 5' 4\" (1.626 m)   Wt 210 lb (95.3 kg)   SpO2 97%   Breastfeeding Yes   BMI 36.05 kg/m²     Constitutional: appears uncomfortable and itchy, otherwise well appearing  Skin: rash as described in HPI       An electronic signature was used to authenticate this note.   -- Harrison Lyn MD

## 2023-03-03 ENCOUNTER — OFFICE VISIT (OUTPATIENT)
Dept: FAMILY MEDICINE CLINIC | Age: 33
End: 2023-03-03

## 2023-03-03 VITALS
WEIGHT: 209 LBS | RESPIRATION RATE: 16 BRPM | BODY MASS INDEX: 35.68 KG/M2 | HEIGHT: 64 IN | DIASTOLIC BLOOD PRESSURE: 75 MMHG | HEART RATE: 68 BPM | SYSTOLIC BLOOD PRESSURE: 128 MMHG | OXYGEN SATURATION: 97 % | TEMPERATURE: 98 F

## 2023-03-03 DIAGNOSIS — L29.8 PRURITIC ERYTHEMATOUS RASH: ICD-10-CM

## 2023-03-03 RX ORDER — PREDNISONE 20 MG/1
40 TABLET ORAL DAILY
Qty: 10 TABLET | Refills: 0 | Status: SHIPPED | OUTPATIENT
Start: 2023-03-03 | End: 2023-03-08

## 2023-03-03 NOTE — ASSESSMENT & PLAN NOTE
Exam shows small reducible hemorrhoid with small fissure. Recommend topical preparation, stool softeners, and return as needed.

## 2023-03-03 NOTE — ASSESSMENT & PLAN NOTE
Consistent with urticaria postpartum; will write one more round of prednisone burst to see rash to full resolution, can continue zyrtec 10mg daily as well to resolved. Return only PRN.

## 2023-03-03 NOTE — PROGRESS NOTES
Rodney Sue (: 1990) is a 27 y.o. female, established patient, here for evaluation of the following chief complaint(s):  Follow-up (Rash on hands resolving /)       ASSESSMENT/PLAN:  Below is the assessment and plan developed based on review of pertinent history, physical exam, labs, studies, and medications. 1. Hemorrhoids, postpartum  Assessment & Plan:   Exam shows small reducible hemorrhoid with small fissure. Recommend topical preparation, stool softeners, and return as needed. 2. Pruritic erythematous rash  Assessment & Plan:   Consistent with urticaria postpartum; will write one more round of prednisone burst to see rash to full resolution, can continue zyrtec 10mg daily as well to resolved. Return only PRN. No follow-ups on file. SUBJECTIVE/OBJECTIVE:  HPI  Chronic Conditions Addressed Today       1. Pruritic erythematous rash     Overview      Background:  Some itching of anterior abdomen week prior to delivery. Delivered healthy baby 1 week ago, and since then has started developing itchy raised erythematous rash with wheeling appearance. Started on anterior abdomen around stretch marks, then spread to anterior thighs, bilateral flanks, and extremities, sparing palmoplantar regions and face/groin. Interval HX:  - last visit started prednisone burst 40mg daily x 5 days and zyrtec 10mg daily for itching.  - today, patient notes great improvements in rash, about 75% improved. Noticed improvement rate was better while on the prednisone.  - denies medication side effects for herself or breastfeeding infant. 2. Hemorrhoids, postpartum - Primary     Overview      Improvements since last visit, preparation H helping, though painful with stooling/wiping. No blood, no persistent painful lump.                          Review of Systems  As per HPI    Physical Exam  Visit Vitals  /75   Pulse 68   Temp 98 °F (36.7 °C)   Resp 16   Ht 5' 4\" (1.626 m)   Wt 209 lb (94.8 kg)   SpO2 97%   BMI 35.87 kg/m²     Constitutional: appears far more comfortable today  Skin: see previous note for full documented rash, much less erythematous, abdominal portion nearly resolved. Rectal: external exam only, small subcentimeter hemorrhoid, reducible. Small fissure midline tender. An electronic signature was used to authenticate this note.   -- Chuck Turner MD

## 2023-03-20 RX ORDER — CETIRIZINE HYDROCHLORIDE 10 MG/1
10 TABLET ORAL
Qty: 30 TABLET | Refills: 0 | Status: SHIPPED | OUTPATIENT
Start: 2023-03-20 | End: 2023-04-19

## 2023-05-12 ENCOUNTER — TELEMEDICINE (OUTPATIENT)
Facility: CLINIC | Age: 33
End: 2023-05-12
Payer: MEDICAID

## 2023-05-12 DIAGNOSIS — F41.9 ANXIETY: ICD-10-CM

## 2023-05-12 DIAGNOSIS — F10.10 ALCOHOL CONSUMPTION BINGE DRINKING: ICD-10-CM

## 2023-05-12 DIAGNOSIS — F32.A DEPRESSION, UNSPECIFIED DEPRESSION TYPE: Primary | ICD-10-CM

## 2023-05-12 DIAGNOSIS — F43.21 GRIEF: ICD-10-CM

## 2023-05-12 PROCEDURE — 99214 OFFICE O/P EST MOD 30 MIN: CPT | Performed by: FAMILY MEDICINE

## 2023-05-12 RX ORDER — ACETAMINOPHEN 500 MG
500 TABLET ORAL EVERY 6 HOURS PRN
COMMUNITY

## 2023-05-12 RX ORDER — LEVONORGESTREL 52 MG/1
1 INTRAUTERINE DEVICE INTRAUTERINE ONCE
COMMUNITY

## 2023-05-12 SDOH — ECONOMIC STABILITY: FOOD INSECURITY: WITHIN THE PAST 12 MONTHS, YOU WORRIED THAT YOUR FOOD WOULD RUN OUT BEFORE YOU GOT MONEY TO BUY MORE.: NEVER TRUE

## 2023-05-12 SDOH — ECONOMIC STABILITY: HOUSING INSECURITY
IN THE LAST 12 MONTHS, WAS THERE A TIME WHEN YOU DID NOT HAVE A STEADY PLACE TO SLEEP OR SLEPT IN A SHELTER (INCLUDING NOW)?: NO

## 2023-05-12 SDOH — ECONOMIC STABILITY: INCOME INSECURITY: HOW HARD IS IT FOR YOU TO PAY FOR THE VERY BASICS LIKE FOOD, HOUSING, MEDICAL CARE, AND HEATING?: SOMEWHAT HARD

## 2023-05-12 SDOH — ECONOMIC STABILITY: FOOD INSECURITY: WITHIN THE PAST 12 MONTHS, THE FOOD YOU BOUGHT JUST DIDN'T LAST AND YOU DIDN'T HAVE MONEY TO GET MORE.: PATIENT DECLINED

## 2023-05-12 SDOH — ECONOMIC STABILITY: TRANSPORTATION INSECURITY
IN THE PAST 12 MONTHS, HAS LACK OF TRANSPORTATION KEPT YOU FROM MEETINGS, WORK, OR FROM GETTING THINGS NEEDED FOR DAILY LIVING?: NO

## 2023-05-12 ASSESSMENT — ENCOUNTER SYMPTOMS: SHORTNESS OF BREATH: 0

## 2023-05-12 NOTE — PROGRESS NOTES
Chief Complaint   Patient presents with    Depression    Anxiety     Follow up. 1. Have you been to the ER, urgent care clinic since your last visit? Hospitalized since your last visit? No    2. Have you seen or consulted any other health care providers outside of the [address] [place] [address] since your last visit? Include any pap smears or colon screening.  No Statement Selected

## 2023-05-12 NOTE — PROGRESS NOTES
Geoff Rodriguez (:  1990) is a Established patient, presenting virtually for evaluation of the following:    Assessment & Plan   Below is the assessment and plan developed based on review of pertinent history, physical exam, labs, studies, and medications. 1. Depression, unspecified depression type  -     sertraline (ZOLOFT) 50 MG tablet; Take 1 tablet by mouth daily, Disp-90 tablet, R-1Normal  2. Anxiety  -     sertraline (ZOLOFT) 50 MG tablet; Take 1 tablet by mouth daily, Disp-90 tablet, R-1Normal  3. Alcohol consumption binge drinking  4. Grief    Anxiety, depression and binge drinking not doing well, worse due to grief  Cut out all alcohol  Stop Wellbutrin as it may be contributing to her anxiety  Zoloft  Continue with therapist  Condolences given    Return in about 3 weeks (around 2023) for Depression, Anxiety, binge drinking. Subjective   Patient presents with:  Depression  Anxiety: Follow up. She had her baby in February and went off of her Zoloft due to pregnancy. Her OB put her on Wellbutrin, which did not make any difference. She is not breastfeeding. Her mother  at the end of March followed by her dog 2 weeks later. She is smoking THC, which helps \"but I am not a big weed smoker\". The deaths made her worse, but notes she was very happy prior to this. Current symptoms include not being focused, anxiety, feeling isolated and not wanting to leave the house, feeling lonely and mood swings. She has a therapist but has only seen her once or twice this year, however, she did see them last week and is supposed to see them next week. She has an alcohol problems and is drinking 8-10 drinks over the weekend. Review of Systems   Constitutional:  Negative for unexpected weight change. Respiratory:  Negative for shortness of breath. Cardiovascular:  Negative for chest pain and palpitations.    Psychiatric/Behavioral:  Positive for decreased concentration, dysphoric mood and sleep